# Patient Record
Sex: MALE | Race: WHITE | HISPANIC OR LATINO | URBAN - METROPOLITAN AREA
[De-identification: names, ages, dates, MRNs, and addresses within clinical notes are randomized per-mention and may not be internally consistent; named-entity substitution may affect disease eponyms.]

---

## 2020-01-03 ENCOUNTER — OFFICE VISIT (OUTPATIENT)
Dept: URGENT CARE | Facility: CLINIC | Age: 55
End: 2020-01-03
Payer: COMMERCIAL

## 2020-01-03 VITALS
TEMPERATURE: 98.8 F | HEIGHT: 68 IN | SYSTOLIC BLOOD PRESSURE: 102 MMHG | WEIGHT: 164 LBS | OXYGEN SATURATION: 100 % | DIASTOLIC BLOOD PRESSURE: 76 MMHG | BODY MASS INDEX: 24.86 KG/M2 | RESPIRATION RATE: 16 BRPM | HEART RATE: 85 BPM

## 2020-01-03 DIAGNOSIS — J06.9 ACUTE URI: Primary | ICD-10-CM

## 2020-01-03 PROCEDURE — 99203 OFFICE O/P NEW LOW 30 MIN: CPT | Performed by: FAMILY MEDICINE

## 2020-01-03 NOTE — PATIENT INSTRUCTIONS
Acute viral URI (common cold)  - rest and drink plenty of fluids  - take Tylenol or Motrin as needed   - advised warm salt water gargles and throat lozenges as needed   - run a humidifier at home   - advised using Flonase nasal spray   - may take OTC cold medications as needed   - if symptoms persist despite treatment, worsen, or any new symptoms present, should be seen by PCP for re-check

## 2020-01-03 NOTE — PROGRESS NOTES
[facility]        NAME: Ej Landa is a 47 y o  male  : 1965    MRN: [id number]  DATE: January 3, 2020  TIME: 2:19 PM    Assessment and Plan   Acute URI [J06 9]  1  Acute URI           Patient Instructions     Patient Instructions   Acute viral URI (common cold)  - rest and drink plenty of fluids  - take Tylenol or Motrin as needed   - advised warm salt water gargles and throat lozenges as needed   - run a humidifier at home   - advised using Flonase nasal spray   - may take OTC cold medications as needed   - if symptoms persist despite treatment, worsen, or any new symptoms present, should be seen by PCP for re-check       Follow up with PCP in 5-7 days  Proceed to  ER if symptoms worsen  Chief Complaint     Chief Complaint   Patient presents with    Flu Symptoms         History of Present Illness       46 yo male presents c/o nasal congestion, sinus pressure, sore throat, and dry cough x 1 day  He felt some chills and body aches yesterday but is feeling better today  No fever  No chest pain, SOB, or wheezing  No GI sx  No skin rashes  He did not get the flu shot this year  He has been taking Motrin and Vitamin C as needed  Review of Systems   Review of Systems   Constitutional:        As noted in HPI   HENT:        As noted in HPI   Eyes: Negative  Respiratory:        As noted in HPI   Cardiovascular: Negative  Gastrointestinal: Negative  Musculoskeletal: Negative  Skin: Negative  Neurological: Negative  Current Medications     No current outpatient medications on file      Current Allergies     Allergies as of 2020    (No Known Allergies)            The following portions of the patient's history were reviewed and updated as appropriate: allergies, current medications, past family history, past medical history, past social history, past surgical history and problem list      Past Medical History:   Diagnosis Date    Patient denies medical problems Past Surgical History:   Procedure Laterality Date    APPENDECTOMY         Family History   Problem Relation Age of Onset    No Known Problems Mother     No Known Problems Father          Medications have been verified  Objective   /76   Pulse 85   Temp 98 8 °F (37 1 °C)   Resp 16   Ht 5' 8" (1 727 m)   Wt 74 4 kg (164 lb)   SpO2 100%   BMI 24 94 kg/m²        Physical Exam     Physical Exam   Constitutional: He is oriented to person, place, and time  Vital signs are normal  He appears well-developed and well-nourished  He is active and cooperative  Non-toxic appearance  He does not have a sickly appearance  He does not appear ill  No distress  HENT:   Head: Normocephalic and atraumatic  Right Ear: Tympanic membrane, external ear and ear canal normal    Left Ear: Tympanic membrane, external ear and ear canal normal    Nose: Nose normal    Mouth/Throat: Uvula is midline, oropharynx is clear and moist and mucous membranes are normal    Eyes: Conjunctivae and EOM are normal    Neck: Normal range of motion  Neck supple  Cardiovascular: Normal rate, regular rhythm and normal heart sounds  Pulmonary/Chest: Effort normal and breath sounds normal  No respiratory distress  Lymphadenopathy:     He has no cervical adenopathy  Neurological: He is alert and oriented to person, place, and time  Skin: He is not diaphoretic  Psychiatric: He has a normal mood and affect  His behavior is normal  Judgment and thought content normal    Nursing note and vitals reviewed